# Patient Record
Sex: MALE | Race: WHITE | Employment: FULL TIME | URBAN - METROPOLITAN AREA
[De-identification: names, ages, dates, MRNs, and addresses within clinical notes are randomized per-mention and may not be internally consistent; named-entity substitution may affect disease eponyms.]

---

## 2020-02-13 ENCOUNTER — HOSPITAL ENCOUNTER (OUTPATIENT)
Age: 50
Discharge: ANOTHER HOSPITAL | End: 2020-02-13
Attending: EMERGENCY MEDICINE
Payer: COMMERCIAL

## 2020-02-13 ENCOUNTER — HOSPITAL ENCOUNTER (OUTPATIENT)
Facility: HOSPITAL | Age: 50
Setting detail: OBSERVATION
Discharge: HOME | End: 2020-02-15
Attending: EMERGENCY MEDICINE | Admitting: HOSPITALIST
Payer: COMMERCIAL

## 2020-02-13 ENCOUNTER — APPOINTMENT (OUTPATIENT)
Dept: CT IMAGING | Facility: HOSPITAL | Age: 50
End: 2020-02-13
Attending: EMERGENCY MEDICINE
Payer: COMMERCIAL

## 2020-02-13 VITALS
HEART RATE: 99 BPM | RESPIRATION RATE: 18 BRPM | HEIGHT: 70 IN | OXYGEN SATURATION: 97 % | SYSTOLIC BLOOD PRESSURE: 156 MMHG | TEMPERATURE: 97 F | WEIGHT: 245 LBS | DIASTOLIC BLOOD PRESSURE: 90 MMHG | BODY MASS INDEX: 35.07 KG/M2

## 2020-02-13 DIAGNOSIS — R10.9 ABDOMINAL PAIN, ACUTE: Primary | ICD-10-CM

## 2020-02-13 DIAGNOSIS — A08.4 VIRAL ENTERITIS: ICD-10-CM

## 2020-02-13 DIAGNOSIS — E86.0 DEHYDRATION: ICD-10-CM

## 2020-02-13 PROBLEM — E87.1 HYPONATREMIA: Status: ACTIVE | Noted: 2020-02-13

## 2020-02-13 PROBLEM — R73.9 HYPERGLYCEMIA: Status: ACTIVE | Noted: 2020-02-13

## 2020-02-13 LAB
ALBUMIN SERPL-MCNC: 4.1 G/DL (ref 3.4–5)
ALBUMIN/GLOB SERPL: 1.1 {RATIO} (ref 1–2)
ALP LIVER SERPL-CCNC: 55 U/L (ref 45–117)
ALT SERPL-CCNC: 38 U/L (ref 16–61)
ANION GAP SERPL CALC-SCNC: 4 MMOL/L (ref 0–18)
APTT PPP: 29.5 SECONDS (ref 25.4–36.1)
AST SERPL-CCNC: 27 U/L (ref 15–37)
BASOPHILS # BLD AUTO: 0.06 X10(3) UL (ref 0–0.2)
BASOPHILS NFR BLD AUTO: 0.7 %
BILIRUB SERPL-MCNC: 0.9 MG/DL (ref 0.1–2)
BUN BLD-MCNC: 17 MG/DL (ref 7–18)
BUN/CREAT SERPL: 10.8 (ref 10–20)
C DIFF TOX B STL QL: NEGATIVE
CALCIUM BLD-MCNC: 9.2 MG/DL (ref 8.5–10.1)
CHLORIDE SERPL-SCNC: 106 MMOL/L (ref 98–112)
CO2 SERPL-SCNC: 24 MMOL/L (ref 21–32)
CREAT BLD-MCNC: 1.58 MG/DL (ref 0.7–1.3)
DEPRECATED RDW RBC AUTO: 39.9 FL (ref 35.1–46.3)
EOSINOPHIL # BLD AUTO: 0.23 X10(3) UL (ref 0–0.7)
EOSINOPHIL NFR BLD AUTO: 2.7 %
ERYTHROCYTE [DISTWIDTH] IN BLOOD BY AUTOMATED COUNT: 13.7 % (ref 11–15)
GLOBULIN PLAS-MCNC: 3.9 G/DL (ref 2.8–4.4)
GLUCOSE BLD-MCNC: 102 MG/DL (ref 70–99)
HCT VFR BLD AUTO: 56.4 % (ref 39–53)
HGB BLD-MCNC: 19.7 G/DL (ref 13–17.5)
IMM GRANULOCYTES # BLD AUTO: 0.04 X10(3) UL (ref 0–1)
IMM GRANULOCYTES NFR BLD: 0.5 %
INR BLD: 1 (ref 0.9–1.1)
LIPASE SERPL-CCNC: 146 U/L (ref 73–393)
LYMPHOCYTES # BLD AUTO: 1.29 X10(3) UL (ref 1–4)
LYMPHOCYTES NFR BLD AUTO: 15.1 %
M PROTEIN MFR SERPL ELPH: 8 G/DL (ref 6.4–8.2)
MCH RBC QN AUTO: 29.8 PG (ref 26–34)
MCHC RBC AUTO-ENTMCNC: 34.9 G/DL (ref 31–37)
MCV RBC AUTO: 85.2 FL (ref 80–100)
MONOCYTES # BLD AUTO: 1.14 X10(3) UL (ref 0.1–1)
MONOCYTES NFR BLD AUTO: 13.4 %
NEUTROPHILS # BLD AUTO: 5.77 X10 (3) UL (ref 1.5–7.7)
NEUTROPHILS # BLD AUTO: 5.77 X10(3) UL (ref 1.5–7.7)
NEUTROPHILS NFR BLD AUTO: 67.6 %
OSMOLALITY SERPL CALC.SUM OF ELEC: 280 MOSM/KG (ref 275–295)
PLATELET # BLD AUTO: 237 10(3)UL (ref 150–450)
POTASSIUM SERPL-SCNC: 4.4 MMOL/L (ref 3.5–5.1)
PSA SERPL DL<=0.01 NG/ML-MCNC: 13.6 SECONDS (ref 12.5–14.7)
RBC # BLD AUTO: 6.62 X10(6)UL (ref 4.3–5.7)
SODIUM SERPL-SCNC: 134 MMOL/L (ref 136–145)
WBC # BLD AUTO: 8.5 X10(3) UL (ref 4–11)

## 2020-02-13 PROCEDURE — 74177 CT ABD & PELVIS W/CONTRAST: CPT | Performed by: EMERGENCY MEDICINE

## 2020-02-13 PROCEDURE — 96372 THER/PROPH/DIAG INJ SC/IM: CPT

## 2020-02-13 PROCEDURE — 99220 INITIAL OBSERVATION CARE,LEVL III: CPT | Performed by: INTERNAL MEDICINE

## 2020-02-13 PROCEDURE — 99204 OFFICE O/P NEW MOD 45 MIN: CPT

## 2020-02-13 RX ORDER — METOCLOPRAMIDE HYDROCHLORIDE 5 MG/ML
10 INJECTION INTRAMUSCULAR; INTRAVENOUS EVERY 8 HOURS PRN
Status: DISCONTINUED | OUTPATIENT
Start: 2020-02-13 | End: 2020-02-15

## 2020-02-13 RX ORDER — ONDANSETRON 2 MG/ML
4 INJECTION INTRAMUSCULAR; INTRAVENOUS EVERY 6 HOURS PRN
Status: DISCONTINUED | OUTPATIENT
Start: 2020-02-13 | End: 2020-02-15

## 2020-02-13 RX ORDER — KETOROLAC TROMETHAMINE 30 MG/ML
15 INJECTION, SOLUTION INTRAMUSCULAR; INTRAVENOUS EVERY 6 HOURS PRN
Status: DISCONTINUED | OUTPATIENT
Start: 2020-02-13 | End: 2020-02-15

## 2020-02-13 RX ORDER — KETOROLAC TROMETHAMINE 30 MG/ML
30 INJECTION, SOLUTION INTRAMUSCULAR; INTRAVENOUS ONCE
Status: DISCONTINUED | OUTPATIENT
Start: 2020-02-13 | End: 2020-02-13

## 2020-02-13 RX ORDER — DICYCLOMINE HYDROCHLORIDE 10 MG/ML
20 INJECTION INTRAMUSCULAR ONCE
Status: COMPLETED | OUTPATIENT
Start: 2020-02-13 | End: 2020-02-13

## 2020-02-13 RX ORDER — ONDANSETRON 2 MG/ML
INJECTION INTRAMUSCULAR; INTRAVENOUS
Status: DISPENSED
Start: 2020-02-13 | End: 2020-02-13

## 2020-02-13 RX ORDER — DIPHENHYDRAMINE HYDROCHLORIDE 50 MG/ML
25 INJECTION INTRAMUSCULAR; INTRAVENOUS ONCE
Status: COMPLETED | OUTPATIENT
Start: 2020-02-13 | End: 2020-02-13

## 2020-02-13 RX ORDER — DEXTROSE AND SODIUM CHLORIDE 5; .9 G/100ML; G/100ML
INJECTION, SOLUTION INTRAVENOUS CONTINUOUS
Status: DISCONTINUED | OUTPATIENT
Start: 2020-02-13 | End: 2020-02-15

## 2020-02-13 RX ORDER — HYDROMORPHONE HYDROCHLORIDE 1 MG/ML
0.5 INJECTION, SOLUTION INTRAMUSCULAR; INTRAVENOUS; SUBCUTANEOUS ONCE
Status: COMPLETED | OUTPATIENT
Start: 2020-02-13 | End: 2020-02-13

## 2020-02-13 RX ORDER — SODIUM CHLORIDE 9 MG/ML
INJECTION, SOLUTION INTRAVENOUS CONTINUOUS
Status: ACTIVE | OUTPATIENT
Start: 2020-02-13 | End: 2020-02-13

## 2020-02-13 RX ORDER — HYDROMORPHONE HYDROCHLORIDE 1 MG/ML
0.5 INJECTION, SOLUTION INTRAMUSCULAR; INTRAVENOUS; SUBCUTANEOUS EVERY 4 HOURS PRN
Status: DISCONTINUED | OUTPATIENT
Start: 2020-02-13 | End: 2020-02-15

## 2020-02-13 RX ORDER — ONDANSETRON 2 MG/ML
4 INJECTION INTRAMUSCULAR; INTRAVENOUS ONCE
Status: COMPLETED | OUTPATIENT
Start: 2020-02-13 | End: 2020-02-13

## 2020-02-13 RX ORDER — KETOROLAC TROMETHAMINE 30 MG/ML
30 INJECTION, SOLUTION INTRAMUSCULAR; INTRAVENOUS ONCE
Status: COMPLETED | OUTPATIENT
Start: 2020-02-13 | End: 2020-02-13

## 2020-02-13 RX ORDER — ONDANSETRON 2 MG/ML
4 INJECTION INTRAMUSCULAR; INTRAVENOUS EVERY 4 HOURS PRN
Status: DISCONTINUED | OUTPATIENT
Start: 2020-02-13 | End: 2020-02-13

## 2020-02-13 RX ORDER — SODIUM CHLORIDE 9 MG/ML
1000 INJECTION, SOLUTION INTRAVENOUS ONCE
Status: COMPLETED | OUTPATIENT
Start: 2020-02-13 | End: 2020-02-13

## 2020-02-13 RX ORDER — KETOROLAC TROMETHAMINE 30 MG/ML
30 INJECTION, SOLUTION INTRAMUSCULAR; INTRAVENOUS EVERY 6 HOURS PRN
Status: DISCONTINUED | OUTPATIENT
Start: 2020-02-13 | End: 2020-02-15

## 2020-02-13 RX ORDER — ENOXAPARIN SODIUM 100 MG/ML
40 INJECTION SUBCUTANEOUS DAILY
Status: DISCONTINUED | OUTPATIENT
Start: 2020-02-13 | End: 2020-02-15

## 2020-02-13 RX ORDER — ONDANSETRON 4 MG/1
4 TABLET, ORALLY DISINTEGRATING ORAL ONCE
Status: COMPLETED | OUTPATIENT
Start: 2020-02-13 | End: 2020-02-13

## 2020-02-13 RX ORDER — METOCLOPRAMIDE HYDROCHLORIDE 5 MG/ML
5 INJECTION INTRAMUSCULAR; INTRAVENOUS ONCE
Status: COMPLETED | OUTPATIENT
Start: 2020-02-13 | End: 2020-02-13

## 2020-02-13 NOTE — ED NOTES
Pt awake and alert, skin w/d,resps reg/unlabored. Pt appears comfortable, states nausea subsided, pt pain now 4/10.

## 2020-02-13 NOTE — ED NOTES
Pt awake and alert, skin w/d,resps reg/unlabored. Pt appears comfortable, states nausea improved. Pt ready for transport to floor with belongings accompanied by transporter. Admission nurse at bedside.

## 2020-02-13 NOTE — ED PROVIDER NOTES
Patient Seen in: [facility]      History   Patient presents with:  Abdomen/Flank Pain  Nausea  Diarrhea    Stated Complaint: abdominal pain, diarrhea, nausea, headache     HPI    Patient is a pleasant 69-year-old gentleman scar down the middle of the abdomen. Tender with guarding. Skin: No rashes, pallor  Neuro: No focal deficits.   Extremities: No cyanosis or edema    ED Course   Labs Reviewed - No data to display       Patient presents with significant abdominal pain to

## 2020-02-13 NOTE — ED PROVIDER NOTES
Patient Seen in: [facility] Emergency Department      History   Patient presents with:  Abdominal Pain    Stated Complaint: ABD PAIN    HPI    Patient is a 59-year-old male presents emergency room with a history of mid and lower abdominal pain with (Temporal)   Resp 18   Ht 177.8 cm (5' 10\")   Wt 111 kg   SpO2 94%   BMI 35.11 kg/m²         Physical Exam  GENERAL: Well-developed, well-nourished male sitting up breathing easily in no apparent distress. Patient is nontoxic in appearance.   HEENT: Head Narrative: The following orders were created for panel order CBC WITH DIFFERENTIAL WITH PLATELET.   Procedure                               Abnormality         Status                     ---------                               -----------         ------ partial small bowel obstruction. Patient was given multiple doses of IV fluid as well as antiemetics and multiple dose of IV pain medication emergency room.   Patient's case discussed with Dr. Jody Gee who wanted to evaluate the patient first before notifyin

## 2020-02-13 NOTE — PROGRESS NOTES
NURSING ADMISSION NOTE      Patient admitted via Cart  Bed in low position. Call light in reach. Admission navigator questions including medication reconciliation completed.

## 2020-02-13 NOTE — H&P
[facility]  History and Physical     Rahel Albarran Patient Status:  Observation    10/14/1970 MRN [id number]   [facility] 5NW-A Attending Yessenia Munoz MD   Hosp Day # 0 PCP PHYSICIAN NONSTAFF     Chief Complaint: abdominal p oriented x 3. HEENT: Normocephalic atraumatic. Moist mucous membranes. EOM-I. PERRLA. Anicteric. Neck: No lymphadenopathy  Respiratory: Clear to auscultation bilaterally. No wheezes. No rhonchi. Cardiovascular: S1, S2. Regular rate and rhythm.  No murmur

## 2020-02-13 NOTE — ED NOTES
Report given to [facility], RN. Bed clean and ready. Transport contacted.

## 2020-02-13 NOTE — ED INITIAL ASSESSMENT (HPI)
The patient is here for evaluation of abdominal pain, nausea, diarrhea, and headaches that started around 4pm yesterday. He states he has had intermittent abdominal pain the last few days. He does have a history of diverticulitis.     Dr. Jeimy Hogan is at the

## 2020-02-13 NOTE — ED NOTES
Pt states he is feeling slightly better, pain now 5/10.  Pt awake and alert, skin w/d,resps reg/unlabored

## 2020-02-13 NOTE — ED INITIAL ASSESSMENT (HPI)
Pt presents to the ED with complaints of abdominal pain, nausea and diarrhea. Pt sent here from [address] [place] [address] for further evaluation and r/o diverticulitis. Pt received IM toradol and oral zofran with some relief of pain.  Pt awake and alert, skin w/d,resps reg/unlabo

## 2020-02-14 ENCOUNTER — APPOINTMENT (OUTPATIENT)
Dept: GENERAL RADIOLOGY | Facility: HOSPITAL | Age: 50
End: 2020-02-14
Attending: INTERNAL MEDICINE
Payer: COMMERCIAL

## 2020-02-14 LAB
ANION GAP SERPL CALC-SCNC: 5 MMOL/L (ref 0–18)
BUN BLD-MCNC: 17 MG/DL (ref 7–18)
BUN/CREAT SERPL: 11.6 (ref 10–20)
CALCIUM BLD-MCNC: 7.8 MG/DL (ref 8.5–10.1)
CHLORIDE SERPL-SCNC: 111 MMOL/L (ref 98–112)
CO2 SERPL-SCNC: 21 MMOL/L (ref 21–32)
CREAT BLD-MCNC: 1.47 MG/DL (ref 0.7–1.3)
DEPRECATED RDW RBC AUTO: 41.1 FL (ref 35.1–46.3)
ERYTHROCYTE [DISTWIDTH] IN BLOOD BY AUTOMATED COUNT: 12.7 % (ref 11–15)
GLUCOSE BLD-MCNC: 95 MG/DL (ref 70–99)
HCT VFR BLD AUTO: 50.7 % (ref 39–53)
HGB BLD-MCNC: 17 G/DL (ref 13–17.5)
MCH RBC QN AUTO: 29.7 PG (ref 26–34)
MCHC RBC AUTO-ENTMCNC: 33.5 G/DL (ref 31–37)
MCV RBC AUTO: 88.5 FL (ref 80–100)
OSMOLALITY SERPL CALC.SUM OF ELEC: 285 MOSM/KG (ref 275–295)
PLATELET # BLD AUTO: 205 10(3)UL (ref 150–450)
POTASSIUM SERPL-SCNC: 4.4 MMOL/L (ref 3.5–5.1)
RBC # BLD AUTO: 5.73 X10(6)UL (ref 4.3–5.7)
SODIUM SERPL-SCNC: 137 MMOL/L (ref 136–145)
WBC # BLD AUTO: 5.8 X10(3) UL (ref 4–11)

## 2020-02-14 PROCEDURE — 99225 SUBSEQUENT OBSERVATION CARE: CPT | Performed by: INTERNAL MEDICINE

## 2020-02-14 PROCEDURE — 74019 RADEX ABDOMEN 2 VIEWS: CPT | Performed by: INTERNAL MEDICINE

## 2020-02-14 RX ORDER — BISACODYL 10 MG
10 SUPPOSITORY, RECTAL RECTAL
Status: DISCONTINUED | OUTPATIENT
Start: 2020-02-14 | End: 2020-02-15

## 2020-02-14 NOTE — PROGRESS NOTES
[facility]  Progress Note     Alex Albarran Patient Status:  Observation    10/14/1970 MRN [id number]   [facility] 5NW-A Attending Rhea Miller MD   Hosp Day # 0 PCP PHYSICIAN NONSTAFF     Chief Complaint: abd pain     S: Patie 2. IVF  3. Antiemetic, pain control   4. Cdiff neg   5. Consider NGT placement and surgery eval if clinically worsens   6. AB XR noted   7. dulcolex supp   2. BRIANA  1. IVF, BMP in am   3. leukocytosis   1. ? Stress induced   2. Cdiff pending   4.  Trudi Riedel

## 2020-02-14 NOTE — PLAN OF CARE
Patient is AO x 4. Maintains O2 sats on room air. VSS. Afebrile. IVF infusing. Intermittent abdominal pain, controlled well with IV toradol, IV dilaudid for breakthrough pain. Overall patient states pain is better today.  Xray obstructive series done this m goals for specific interventions     Outcome: Progressing     Problem: GASTROINTESTINAL - ADULT  Goal: Minimal or absence of nausea and vomiting  Description  INTERVENTIONS:  - Maintain adequate hydration with IV or PO as ordered and tolerated  - Nasogastr

## 2020-02-14 NOTE — PROGRESS NOTES
02/13/20 2126   Provider Notification   Reason for Communication Patient request  (pain med)   Provider Name Other (comment)  Jaqui Ac   Method of Communication Page   Response Waiting for response   Notification Time 2127   repaged 2200    See orders

## 2020-02-14 NOTE — PLAN OF CARE
Problem: Patient/Family Goals  Goal: Patient/Family Long Term Goal  Description  Patient's Long Term Goal:   2/13 ADMIT: Quickly resolve abdominal issues to get back home    Interventions:  - Bowel rest  - IVF  - Pain management  - Stool studies  - See a non-pharmacological measures as appropriate and evaluate response  - Consider cultural and social influences on pain and pain management  - Manage/alleviate anxiety  - Utilize distraction and/or relaxation techniques  - Monitor for opioid side effects  - N

## 2020-02-15 VITALS
HEIGHT: 70 IN | HEART RATE: 72 BPM | TEMPERATURE: 98 F | BODY MASS INDEX: 35.32 KG/M2 | SYSTOLIC BLOOD PRESSURE: 142 MMHG | RESPIRATION RATE: 18 BRPM | OXYGEN SATURATION: 95 % | WEIGHT: 246.69 LBS | DIASTOLIC BLOOD PRESSURE: 76 MMHG

## 2020-02-15 PROCEDURE — 99217 OBSERVATION CARE DISCHARGE: CPT | Performed by: INTERNAL MEDICINE

## 2020-02-15 RX ORDER — ONDANSETRON 4 MG/1
4 TABLET, ORALLY DISINTEGRATING ORAL EVERY 8 HOURS PRN
Qty: 20 TABLET | Refills: 0 | Status: SHIPPED | OUTPATIENT
Start: 2020-02-15

## 2020-02-15 RX ORDER — ONDANSETRON 4 MG/1
4 TABLET, ORALLY DISINTEGRATING ORAL EVERY 8 HOURS PRN
Qty: 20 TABLET | Refills: 0 | Status: SHIPPED | OUTPATIENT
Start: 2020-02-15 | End: 2020-02-15

## 2020-02-15 RX ORDER — HYDROCODONE BITARTRATE AND ACETAMINOPHEN 5; 325 MG/1; MG/1
1-2 TABLET ORAL EVERY 4 HOURS PRN
Qty: 8 TABLET | Refills: 0 | Status: SHIPPED | OUTPATIENT
Start: 2020-02-15

## 2020-02-15 NOTE — DISCHARGE SUMMARY
[facility] HOSPITALIST  DISCHARGE SUMMARY     Katelyn Albarran Patient Status:  Observation    10/14/1970 MRN [id number]   [facility] 5NW-A Attending No att. providers found   [address] Day # 0 PCP PHYSICIAN NONSTAFF     Date of Admission: 2020 hours as needed for Pain. Quantity:  8 tablet  Refills:  0     ondansetron 4 MG Tbdp  Commonly known as:  Zofran ODT      Take 1 tablet (4 mg total) by mouth every 8 (eight) hours as needed for Nausea.    Quantity:  20 tablet  Refills:  0        CONTINUE

## 2020-02-15 NOTE — PLAN OF CARE
Problem: Patient/Family Goals  Goal: Patient/Family Long Term Goal  Description  Patient's Long Term Goal:   2/13 ADMIT: Quickly resolve abdominal issues to get back home    Interventions:  - Bowel rest  - IVF  - Pain management  - Stool studies  - See a analgesics based on type and severity of pain and evaluate response  - Implement non-pharmacological measures as appropriate and evaluate response  - Consider cultural and social influences on pain and pain management  - Manage/alleviate anxiety  - Utilize

## 2020-02-15 NOTE — PROGRESS NOTES
NURSING DISCHARGE NOTE    Discharged Home via Ambulatory. Accompanied by self  Belongings taken home by pt     Pt d/c via ambulatory. Given paperwork and prescriptions. All paperwork explained. No c/o pain SOB or n/v/d upon d/c. Gabe Bowers

## 2020-02-15 NOTE — PLAN OF CARE
Pt is aox4, VSS, afebrile. Ra, no tele. Lovenox. Up independently. C/o some pain this AM, see MAR. IVF. Tolerating clear liquid diet. Pt has no c/o SOB or n/v/d. Resting comfortably in bed. Will d/c later this AM. Will continue to monitor.      Problem: GAS

## (undated) NOTE — LETTER
02/15/20    Ernesto Albarran      To Whom It May Concern: The above patient was seen at [facility] for treatment of a medical condition from 2/13/2020-2/15/2020. The patient may return to work on  2/17/2020 without any restrictions.    He may als

## (undated) NOTE — IP AVS SNAPSHOT
Patient Demographics     Address  [address] [place] [id number] Phone  [phone] (Home)  [phone] (Mobile) E-mail Address  [email or web address]      Emergency Contact(s)     Name Relation Home Work [address]  Friend   [phone] HYDROmorphone HCl (DILAUDID) 1 MG/ML injection 0.5 mg 02/14/20 1956 Given      [id number] HYDROmorphone HCl (DILAUDID) 1 MG/ML injection 0.5 mg 02/14/20 2322 Given      [id number] dextrose 5 % and 0.9 % NaCl infusion 02/14/20 [address] [place] [id number] Narrative: The following orders were created for panel order STOOL CULTURE W/JOY.   Procedure                               Abnormality         Status                     ---------                               -----------         ------ He denies any fever, chills, heamtochezia, CP/SOB. He is not aware of eating any unusual food that may have precipitated his sx.   He has had multiple abd hernia repair in the past.[NB.2]     Past Medical History:  Past Medical History:   Diagnosis Date   • Diagnostic Data:      Labs:  Recent Labs   Lab 02/13/20  0936   WBC 8.5   HGB 19.7*   MCV 85.2   .0   INR 1.00       Recent Labs   Lab 02/13/20  0936   *   BUN 17   CREATSERUM 1.58*   GFRAA 59*   GFRNAA 51*   CA 9.2   ALB 4.1   *   K 4. No notes of this type exist for this encounter.      Multidisciplinary Problems     Active Goals        Problem: Patient/Family Goals    Goal Priority Disciplines Outcome Interventions   Patient/Family Long Term Goal     Interdisciplinary Adequate for Disch